# Patient Record
Sex: FEMALE | URBAN - METROPOLITAN AREA
[De-identification: names, ages, dates, MRNs, and addresses within clinical notes are randomized per-mention and may not be internally consistent; named-entity substitution may affect disease eponyms.]

---
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
## 2019-01-03 ENCOUNTER — TELEPHONE (OUTPATIENT)
Dept: SURGERY | Facility: CLINIC | Age: 19
End: 2019-01-03

## 2019-01-03 NOTE — TELEPHONE ENCOUNTER
Writer spoke with patient over the phone. Reminded them of their upcoming appointment which is scheduled on 01/08/19 at 0830 am. Patient had no questions or concerns this day. They were given our call back number in the event they need to reschedule or have a question.     Estelita Jesus LPN  [facility]

## 2019-01-08 ENCOUNTER — TELEPHONE (OUTPATIENT)
Dept: PLASTIC SURGERY | Facility: CLINIC | Age: 19
End: 2019-01-08

## 2019-01-08 ENCOUNTER — OFFICE VISIT (OUTPATIENT)
Dept: PLASTIC SURGERY | Facility: CLINIC | Age: 19
End: 2019-01-08
Attending: PLASTIC SURGERY
Payer: COMMERCIAL

## 2019-01-08 ENCOUNTER — MYC MEDICAL ADVICE (OUTPATIENT)
Dept: PLASTIC SURGERY | Facility: CLINIC | Age: 19
End: 2019-01-08

## 2019-01-08 VITALS
DIASTOLIC BLOOD PRESSURE: 90 MMHG | WEIGHT: 293 LBS | HEART RATE: 91 BPM | TEMPERATURE: 97.6 F | OXYGEN SATURATION: 100 % | HEIGHT: 70 IN | SYSTOLIC BLOOD PRESSURE: 132 MMHG | BODY MASS INDEX: 41.95 KG/M2 | RESPIRATION RATE: 14 BRPM

## 2019-01-08 DIAGNOSIS — N62 HYPERTROPHY OF BREAST: Primary | ICD-10-CM

## 2019-01-08 PROCEDURE — G0463 HOSPITAL OUTPT CLINIC VISIT: HCPCS | Mod: ZF

## 2019-01-08 RX ORDER — CEFAZOLIN SODIUM 1 G/50ML
1 INJECTION, SOLUTION INTRAVENOUS SEE ADMIN INSTRUCTIONS
Status: CANCELLED | OUTPATIENT
Start: 2019-01-08

## 2019-01-08 RX ORDER — CEFAZOLIN SODIUM IN 0.9 % NACL 3 G/100 ML
3 INTRAVENOUS SOLUTION, PIGGYBACK (ML) INTRAVENOUS
Status: CANCELLED | OUTPATIENT
Start: 2019-01-08

## 2019-01-08 ASSESSMENT — MIFFLIN-ST. JEOR: SCORE: 2187.35

## 2019-01-08 ASSESSMENT — PAIN SCALES - GENERAL: PAINLEVEL: NO PAIN (0)

## 2019-01-08 NOTE — LETTER
1/8/2019       RE: Claire Rooney  [address]  [facility] [address]     Dear Colleague,    Thank you for referring your patient, Claire Rooney, to the [facility] BREAST CENTER at [facility]. Please see a copy of my visit note below.    REFERRING PHYSICIAN:  Jazmín Hernandez MD      PRESENTING COMPLAINT:  Consultation for breast reduction.      HISTORY OF PRESENTING COMPLAINT:  Ms. Rooney is 18 years old.  She has been thinking about a breast reduction for about a year now.  She has a lot of upper back, neck, shoulder pain, shoulder grooving from bra straps, inframammary fold rashes during summers.  She wears an H-cup bra.  She would like to be around a C or D cup.  She never has had a mammogram.  She has no family history of breast cancer.      PAST MEDICAL HISTORY:  Nil.      PAST SURGICAL HISTORY:  Nil.      MEDICATIONS:  Birth control.      ALLERGIES:  Nil.      SOCIAL HISTORY:  Does not smoke, does not drink.  Lives in [place].  Is studying psychology.      REVIEW OF SYSTEMS:  Denies chest pain, shortness of breath, MI, CVA, DVT and PE.      PHYSICAL EXAMINATION:  Vital signs stable.  She is afebrile, in no obvious distress.  She is 5 feet 10 inches, 293 pounds, BMI of 42 kg/m2.  Body surface area 2.48 m2.  On examination of her breasts, she has long, pendulous breasts.  Right breast is larger by about 10%-15% than the left side.  Sternal notch to nipple distance is about 40 cm.  She has very glandular, large breasts.      ASSESSMENT AND PLAN:  Based on the above findings, a diagnosis of symptomatic bilateral breast hypertrophy was made.  I had a long discussion with the patient and her mother about what a breast reduction would entail.  Given her young age, clear expectations were given to the patient.  Namely, she may not be able to breastfeed.  She may lose nipple sensation.  She will have permanent scarring.  She may require re-reduction in the  future.  She will be asymmetric and she may require free nipple graft.  All of this was discussed in detail.  The need for prior authorization was explained.  Based on the Schnur scale, 1400 grams needs to be removed from each breast.  I think that is possible but will leave her disproportionately small and she was clearly explained that.  She was okay with that.  The need for a possible free nipple graft was explained, the disadvantage of inability to breastfeed, loss of nipple sensation, hypopigmentation and wound healing complications were explained.  She understood and wants to proceed.  Consent obtained.  Photographs obtained.  We will get prior authorization and proceed as indicated.  All risks, benefits, alternatives of potential procedure including pain, infection, bleeding, scarring, asymmetry, seromas, hematomas, wound breakdown, wound dehiscence, asymmetry, nipple necrosis, fat necrosis, T-junction site necrosis, DVT, PE, MI, CVA, pneumonia, renal failure and death were all explained.  She was happy with visit.  I look forward to helping her out in the near future.        Total time spent with patient 30 minutes, more than half was counseling.            Again, thank you for allowing me to participate in the care of your patient.      Sincerely,    DIONNE Fernando MD      cc:   Jazmín Hernandez MD   [facility]   [address] [place], [place] [id number]

## 2019-01-08 NOTE — PROGRESS NOTES
REFERRING PHYSICIAN:  Jazmín Hernandez MD      PRESENTING COMPLAINT:  Consultation for breast reduction.      HISTORY OF PRESENTING COMPLAINT:  Ms. Rooney is 18 years old.  She has been thinking about a breast reduction for about a year now.  She has a lot of upper back, neck, shoulder pain, shoulder grooving from bra straps, inframammary fold rashes during summers.  She wears an H-cup bra.  She would like to be around a C or D cup.  She never has had a mammogram.  She has no family history of breast cancer.      PAST MEDICAL HISTORY:  Nil.      PAST SURGICAL HISTORY:  Nil.      MEDICATIONS:  Birth control.      ALLERGIES:  Nil.      SOCIAL HISTORY:  Does not smoke, does not drink.  Lives in [place].  Is studying psychology.      REVIEW OF SYSTEMS:  Denies chest pain, shortness of breath, MI, CVA, DVT and PE.      PHYSICAL EXAMINATION:  Vital signs stable.  She is afebrile, in no obvious distress.  She is 5 feet 10 inches, 293 pounds, BMI of 42 kg/m2.  Body surface area 2.48 m2.  On examination of her breasts, she has long, pendulous breasts.  Right breast is larger by about 10%-15% than the left side.  Sternal notch to nipple distance is about 40 cm.  She has very glandular, large breasts.      ASSESSMENT AND PLAN:  Based on the above findings, a diagnosis of symptomatic bilateral breast hypertrophy was made.  I had a long discussion with the patient and her mother about what a breast reduction would entail.  Given her young age, clear expectations were given to the patient.  Namely, she may not be able to breastfeed.  She may lose nipple sensation.  She will have permanent scarring.  She may require re-reduction in the future.  She will be asymmetric and she may require free nipple graft.  All of this was discussed in detail.  The need for prior authorization was explained.  Based on the Schnur scale, 1400 grams needs to be removed from each breast.  I think that is possible but will leave her disproportionately  small and she was clearly explained that.  She was okay with that.  The need for a possible free nipple graft was explained, the disadvantage of inability to breastfeed, loss of nipple sensation, hypopigmentation and wound healing complications were explained.  She understood and wants to proceed.  Consent obtained.  Photographs obtained.  We will get prior authorization and proceed as indicated.  All risks, benefits, alternatives of potential procedure including pain, infection, bleeding, scarring, asymmetry, seromas, hematomas, wound breakdown, wound dehiscence, asymmetry, nipple necrosis, fat necrosis, T-junction site necrosis, DVT, PE, MI, CVA, pneumonia, renal failure and death were all explained.  She was happy with visit.  I look forward to helping her out in the near future.        Total time spent with patient 30 minutes, more than half was counseling.      cc:   Jazmín Hernandez MD   [facility]   [address] [place] [address]  [id number]

## 2019-01-08 NOTE — NURSING NOTE
"Oncology Rooming Note    January 8, 2019 8:29 AM   Claire Rooney is a 18 year old female who presents for:    Chief Complaint   Patient presents with     Oncology Clinic Visit     [facility] NEW- CONSULT FOR BREAST REDUCTION     Initial Vitals: /90 (BP Location: Right arm, Patient Position: Chair, Cuff Size: Adult Large)   Pulse 91   Temp 97.6  F (36.4  C) (Oral)   Resp 14   Ht 1.78 m (5' 10.08\")   Wt 133.1 kg (293 lb 6.4 oz)   SpO2 100%   BMI 42.00 kg/m   Estimated body mass index is 42 kg/m  as calculated from the following:    Height as of this encounter: 1.78 m (5' 10.08\").    Weight as of this encounter: 133.1 kg (293 lb 6.4 oz). Body surface area is 2.57 meters squared.  No Pain (0) Comment: Data Unavailable   No LMP recorded.  Allergies reviewed: Yes  Medications reviewed: Yes    Medications: Medication refills not needed today.  Pharmacy name entered into EPIC: Data Unavailable    Clinical concerns: New patientAubree Fernanod was notified.    10 minutes for nursing intake (face to face time)     Robert Hernandez LPN            "

## 2019-01-08 NOTE — TELEPHONE ENCOUNTER
Left message to schedule procedure with Dr Maurice Sloan    Details left with my direct contact number for scheduling.     Viola Nolan  Rebekah-Op Surgical Coordinator  [phone]

## 2019-01-14 ENCOUNTER — TELEPHONE (OUTPATIENT)
Dept: SURGERY | Facility: CLINIC | Age: 19
End: 2019-01-14

## 2019-01-14 ENCOUNTER — TELEPHONE (OUTPATIENT)
Dept: PLASTIC SURGERY | Facility: CLINIC | Age: 19
End: 2019-01-14

## 2019-01-14 NOTE — TELEPHONE ENCOUNTER
Spoke with patient to schedule surgery with Dr Sukhjinder Fernando    Sur[facility] was scheduled [facility]    Patient will have Pre-Op with [facility] on 3/5  -Patient advised H&P is needed or surgery cancellation may occur    Post-Op care appointment scheduled?  YES on 3/19    Patient is aware a / is needed day of surgery.     Surgery packet was sent via [employer], patient has my direct contact information for any further questions.     Viola Nolan  Surgical Rebekah-Op Coordinator  [phone]

## 2019-01-18 ENCOUNTER — TELEPHONE (OUTPATIENT)
Dept: SURGERY | Facility: CLINIC | Age: 19
End: 2019-01-18

## 2019-01-18 NOTE — TELEPHONE ENCOUNTER
Received [facility] prior authorization approval #[id number] for bilateral  breast mammoplasty, date span 1/14/19-7/14/19

## 2019-02-11 ENCOUNTER — TELEPHONE (OUTPATIENT)
Dept: SURGERY | Facility: CLINIC | Age: 19
End: 2019-02-11

## 2019-02-11 NOTE — TELEPHONE ENCOUNTER
Spoke with patient regarding surgery date. She had preferred to move it up if possible.  Advised 2/28 may work, will contact her 2/12 after speaking with OR .    She also needed to check with her family and school.

## 2019-02-12 ENCOUNTER — TELEPHONE (OUTPATIENT)
Dept: PLASTIC SURGERY | Facility: CLINIC | Age: 19
End: 2019-02-12

## 2019-02-12 NOTE — TELEPHONE ENCOUNTER
Please change the dates on the letter to reflect her new surgery date of 2/28. Ok to send via [employer].    Thanks!

## 2019-02-13 ASSESSMENT — ENCOUNTER SYMPTOMS
PANIC: 0
INSOMNIA: 1
DECREASED CONCENTRATION: 0
NERVOUS/ANXIOUS: 1
DEPRESSION: 1

## 2019-02-25 SDOH — HEALTH STABILITY: MENTAL HEALTH: HOW OFTEN DO YOU HAVE A DRINK CONTAINING ALCOHOL?: NEVER

## 2019-02-26 ENCOUNTER — OFFICE VISIT (OUTPATIENT)
Dept: PLASTIC SURGERY | Facility: CLINIC | Age: 19
End: 2019-02-26
Attending: PLASTIC SURGERY
Payer: COMMERCIAL

## 2019-02-26 VITALS
OXYGEN SATURATION: 100 % | BODY MASS INDEX: 41.95 KG/M2 | HEIGHT: 70 IN | RESPIRATION RATE: 16 BRPM | HEART RATE: 70 BPM | TEMPERATURE: 98.8 F | SYSTOLIC BLOOD PRESSURE: 115 MMHG | DIASTOLIC BLOOD PRESSURE: 75 MMHG | WEIGHT: 293 LBS

## 2019-02-26 DIAGNOSIS — N62 HYPERTROPHY OF BREAST: Primary | ICD-10-CM

## 2019-02-26 PROCEDURE — G0463 HOSPITAL OUTPT CLINIC VISIT: HCPCS | Mod: ZF

## 2019-02-26 ASSESSMENT — MIFFLIN-ST. JEOR: SCORE: 2187.37

## 2019-02-26 NOTE — LETTER
2/26/2019     RE: Claire Rooney  [address]  [facility] [facility] [address]     Dear Colleague,    Thank you for referring your patient, Claire Rooney, to the [facility] BREAST CENTER at [facility]. Please see a copy of my visit note below.    PRESENTING COMPLAINT:  Preoperative visit for upcoming bilateral breast reduction scheduled for 02/28/2019.      HISTORY OF PRESENTING COMPLAINT:  Ms. Rooney is 18 years old.  She is scheduled for bilateral breast reduction and is here for a preoperative visit.  The plan is to remove about 1400 g per side if that is possible.  She may need a free nipple graft, and she may be disproportionately small.  She understands and is okay with it.  No change in her history and physical exam.  No history of a DVT.      ASSESSMENT AND PLAN:  Based upon the above findings, a diagnosis of symptomatic bilateral breast hypertrophy was made.  The plan is to do a bilateral breast reduction in the coming days.  No change in her history and physical exam.  I went over the planned procedure with the patient and her mother in detail.  The expectations were clearly explained.  The chance of a free nipple graft was clearly explained.  All risks, benefits and alternatives, including pain, infection, bleeding, scarring, asymmetry, seromas, hematomas, wound breakdown, wound dehiscence, loss of sensation of the nipple and breast, hypopigmentation and wound-healing complications if a free nipple graft were done were explained, fat necrosis, skin necrosis, T-junction site necrosis, nipple necrosis, inability to breastfeed, re-hypertrophy in the future, prominent scar, hypertrophic scar, keloid scar, DVT, PE, MI, CVA, pneumonia, renal failure and death, were explained.  I look forward to helping her out.  She wants to proceed.  All questions were answered.  She was happy with the visit.        Total time spent with the patient was 15 minutes, of which  more than half was counseling.     DIONNE Fernando MD

## 2019-02-26 NOTE — PROGRESS NOTES
PRESENTING COMPLAINT:  Preoperative visit for upcoming bilateral breast reduction scheduled for 02/28/2019.      HISTORY OF PRESENTING COMPLAINT:  Ms. Rooney is 18 years old.  She is scheduled for bilateral breast reduction and is here for a preoperative visit.  The plan is to remove about 1400 g per side if that is possible.  She may need a free nipple graft, and she may be disproportionately small.  She understands and is okay with it.  No change in her history and physical exam.  No history of a DVT.      ASSESSMENT AND PLAN:  Based upon the above findings, a diagnosis of symptomatic bilateral breast hypertrophy was made.  The plan is to do a bilateral breast reduction in the coming days.  No change in her history and physical exam.  I went over the planned procedure with the patient and her mother in detail.  The expectations were clearly explained.  The chance of a free nipple graft was clearly explained.  All risks, benefits and alternatives, including pain, infection, bleeding, scarring, asymmetry, seromas, hematomas, wound breakdown, wound dehiscence, loss of sensation of the nipple and breast, hypopigmentation and wound-healing complications if a free nipple graft were done were explained, fat necrosis, skin necrosis, T-junction site necrosis, nipple necrosis, inability to breastfeed, re-hypertrophy in the future, prominent scar, hypertrophic scar, keloid scar, DVT, PE, MI, CVA, pneumonia, renal failure and death, were explained.  I look forward to helping her out.  She wants to proceed.  All questions were answered.  She was happy with the visit.        Total time spent with the patient was 15 minutes, of which more than half was counseling.

## 2019-02-26 NOTE — NURSING NOTE
"Oncology Rooming Note    February 26, 2019 9:00 AM   Claire Rooney is a 18 year old female who presents for:    Chief Complaint   Patient presents with     Oncology Clinic Visit     New Pre-op; Vitals completed by CMA      Initial Vitals: /75   Pulse 70   Temp 98.8  F (37.1  C) (Oral)   Resp 16   Ht 1.78 m (5' 10.08\")   Wt 133.1 kg (293 lb 6.4 oz)   SpO2 100%   Breastfeeding? No   BMI 42.00 kg/m   Estimated body mass index is 42 kg/m  as calculated from the following:    Height as of this encounter: 1.78 m (5' 10.08\").    Weight as of this encounter: 133.1 kg (293 lb 6.4 oz). Body surface area is 2.57 meters squared.  Data Unavailable Comment: Data Unavailable   No LMP recorded. Patient has had an implant.  Allergies reviewed: Yes  Medications reviewed: Yes    Medications: Medication refills not needed today.  Pharmacy name entered into EPIC: Data Unavailable    Clinical concerns: No new concerns today  Dr. Fernando was NOT notified.      Georgette Cervantes              "

## 2019-02-27 ENCOUNTER — ANESTHESIA EVENT (OUTPATIENT)
Dept: SURGERY | Facility: AMBULATORY SURGERY CENTER | Age: 19
End: 2019-02-27

## 2019-02-27 ASSESSMENT — LIFESTYLE VARIABLES: TOBACCO_USE: 0

## 2019-02-27 NOTE — ANESTHESIA PREPROCEDURE EVALUATION
Anesthesia Pre-Procedure Evaluation    Patient: Claire Rooney   MRN:     [id number] Gender:   female   Age:    18 year old :      2000        Preoperative Diagnosis: Breast Hypertrophy   Procedure(s):  Bilateral Breast Reduction     No past medical history on file.   No past surgical history on file.       Anesthesia Evaluation     . Pt has not had prior anesthetic            ROS/MED HX    ENT/Pulmonary:      (-) tobacco use   Neurologic:  - neg neurologic ROS     Cardiovascular:  - neg cardiovascular ROS       METS/Exercise Tolerance:     Hematologic:  - neg hematologic  ROS       Musculoskeletal:  - neg musculoskeletal ROS       GI/Hepatic:  - neg GI/hepatic ROS       Renal/Genitourinary:  - ROS Renal section negative       Endo:  - neg endo ROS       Psychiatric:  - neg psychiatric ROS       Infectious Disease:  - neg infectious disease ROS       Malignancy:      - no malignancy   Other: Comment: (Breast Hypertrophy)   - neg other ROS                     PHYSICAL EXAM:   Mental Status/Neuro: A/A/O   Airway: Facies: Feasible  Mallampati: Not Assessed  Mouth/Opening: Not Assessed  TM distance: Not Assessed  Neck ROM: Not Assessed   Respiratory: Auscultation: CTAB     Resp. Rate: Normal     Resp. Effort: Normal      CV: Rhythm: Regular  Rate: Age appropriate  Heart: Normal Sounds   Comments:      Dental: Normal                  No results found for: WBC, HGB, HCT, PLT, CRP, SED, NA, POTASSIUM, CHLORIDE, CO2, BUN, CR, GLC, SULAIMAN, PHOS, MAG, ALBUMIN, PROTTOTAL, ALT, AST, GGT, ALKPHOS, BILITOTAL, BILIDIRECT, LIPASE, AMYLASE, ROSINA, PTT, INR, FIBR, TSH, T4, T3, HCG, HCGS, CKTOTAL, CKMB, TROPN    Preop Vitals  BP Readings from Last 3 Encounters:   19 115/75   19 132/90    Pulse Readings from Last 3 Encounters:   19 70   19 91      Resp Readings from Last 3 Encounters:   19 16   19 14    SpO2 Readings from Last 3 Encounters:   19 100%   19 100%      Temp  "Readings from Last 1 Encounters:   02/26/19 37.1  C (98.8  F) (Oral)    Ht Readings from Last 1 Encounters:   02/26/19 1.78 m (5' 10.08\") (99 %)*     * Growth percentiles are based on CDC (Girls, 2-20 Years) data.      Wt Readings from Last 1 Encounters:   02/26/19 133.1 kg (293 lb 6.4 oz) (>99 %)*     * Growth percentiles are based on CDC (Girls, 2-20 Years) data.    Estimated body mass index is 42 kg/m  as calculated from the following:    Height as of 2/26/19: 1.78 m (5' 10.08\").    Weight as of 2/26/19: 133.1 kg (293 lb 6.4 oz).     LDA:            Assessment:   ASA SCORE: 1    NPO Status: > 2 hours since completed Clear Liquids; > 6 hours since completed Solid Foods   Documentation: H&P complete; Preop Testing complete; Consents complete   Proceeding: Proceed without further delay  Tobacco Use:  NO Active use of Tobacco/UNKNOWN Tobacco use status     Plan:   Anes. Type:  General; Regional     RA Details:  Pre Induction; SS; Exparel; FOR POSTOP PAIN CONTROL; L; R     RA-Location/Type: Plane Block   Pre-Induction: Midazolam IV; Opioid IV; Acetaminophen PO   Induction:  IV (Standard); Propofol   Airway: Oral ETT   Access/Monitoring: PIV   Maintenance: Balanced   Emergence: Procedure Site   Logistics: Same Day Surgery     Postop Pain/Sedation Strategy:  Standard-Options: Opioids PRN; Block SS; Exparel     PONV Management:  Adult Risk Factors: Female, Non-Smoker, Postop Opioids  Prevention: Ondansetron; Dexamethasone     CONSENT: Direct conversation   Plan and risks discussed with: Mother; Patient   Blood Products: N/a       Comments for Plan/Consent:  19 yo for Bilateral Breast Reduction (Bilateral Breast) under GETA/RA                         Adi David MD  "

## 2019-02-28 ENCOUNTER — ANESTHESIA (OUTPATIENT)
Dept: SURGERY | Facility: AMBULATORY SURGERY CENTER | Age: 19
End: 2019-02-28

## 2019-02-28 ENCOUNTER — HOSPITAL ENCOUNTER (OUTPATIENT)
Facility: AMBULATORY SURGERY CENTER | Age: 19
End: 2019-02-28
Attending: PLASTIC SURGERY
Payer: COMMERCIAL

## 2019-02-28 VITALS
HEART RATE: 80 BPM | RESPIRATION RATE: 20 BRPM | BODY MASS INDEX: 42.27 KG/M2 | WEIGHT: 285.4 LBS | TEMPERATURE: 98.4 F | SYSTOLIC BLOOD PRESSURE: 122 MMHG | OXYGEN SATURATION: 100 % | HEIGHT: 69 IN | DIASTOLIC BLOOD PRESSURE: 51 MMHG

## 2019-02-28 DIAGNOSIS — N62 BREAST HYPERTROPHY: Primary | ICD-10-CM

## 2019-02-28 LAB
HCG UR QL: NEGATIVE
INTERNAL QC OK POCT: YES

## 2019-02-28 RX ORDER — FENTANYL CITRATE 50 UG/ML
25-50 INJECTION, SOLUTION INTRAMUSCULAR; INTRAVENOUS
Status: DISCONTINUED | OUTPATIENT
Start: 2019-02-28 | End: 2019-02-28 | Stop reason: HOSPADM

## 2019-02-28 RX ORDER — PROPOFOL 10 MG/ML
INJECTION, EMULSION INTRAVENOUS PRN
Status: DISCONTINUED | OUTPATIENT
Start: 2019-02-28 | End: 2019-02-28

## 2019-02-28 RX ORDER — CEFAZOLIN SODIUM 1 G/50ML
1 SOLUTION INTRAVENOUS SEE ADMIN INSTRUCTIONS
Status: DISCONTINUED | OUTPATIENT
Start: 2019-02-28 | End: 2019-02-28 | Stop reason: HOSPADM

## 2019-02-28 RX ORDER — FENTANYL CITRATE 50 UG/ML
INJECTION, SOLUTION INTRAMUSCULAR; INTRAVENOUS PRN
Status: DISCONTINUED | OUTPATIENT
Start: 2019-02-28 | End: 2019-02-28

## 2019-02-28 RX ORDER — PROPOFOL 10 MG/ML
INJECTION, EMULSION INTRAVENOUS CONTINUOUS PRN
Status: DISCONTINUED | OUTPATIENT
Start: 2019-02-28 | End: 2019-02-28

## 2019-02-28 RX ORDER — AMOXICILLIN 250 MG
1-2 CAPSULE ORAL 2 TIMES DAILY
Qty: 30 TABLET | Refills: 0 | Status: SHIPPED | OUTPATIENT
Start: 2019-02-28 | End: 2019-03-12

## 2019-02-28 RX ORDER — ONDANSETRON 2 MG/ML
4 INJECTION INTRAMUSCULAR; INTRAVENOUS EVERY 30 MIN PRN
Status: DISCONTINUED | OUTPATIENT
Start: 2019-02-28 | End: 2019-03-01 | Stop reason: HOSPADM

## 2019-02-28 RX ORDER — NALOXONE HYDROCHLORIDE 0.4 MG/ML
.1-.4 INJECTION, SOLUTION INTRAMUSCULAR; INTRAVENOUS; SUBCUTANEOUS
Status: DISCONTINUED | OUTPATIENT
Start: 2019-02-28 | End: 2019-03-01 | Stop reason: HOSPADM

## 2019-02-28 RX ORDER — FLUMAZENIL 0.1 MG/ML
0.2 INJECTION, SOLUTION INTRAVENOUS
Status: DISCONTINUED | OUTPATIENT
Start: 2019-02-28 | End: 2019-02-28 | Stop reason: HOSPADM

## 2019-02-28 RX ORDER — GABAPENTIN 300 MG/1
300 CAPSULE ORAL ONCE
Status: COMPLETED | OUTPATIENT
Start: 2019-02-28 | End: 2019-02-28

## 2019-02-28 RX ORDER — BUPIVACAINE HYDROCHLORIDE 2.5 MG/ML
INJECTION, SOLUTION EPIDURAL; INFILTRATION; INTRACAUDAL PRN
Status: DISCONTINUED | OUTPATIENT
Start: 2019-02-28 | End: 2019-02-28

## 2019-02-28 RX ORDER — CEFAZOLIN SODIUM IN 0.9 % NACL 3 G/100 ML
3 INTRAVENOUS SOLUTION, PIGGYBACK (ML) INTRAVENOUS
Status: COMPLETED | OUTPATIENT
Start: 2019-02-28 | End: 2019-02-28

## 2019-02-28 RX ORDER — ONDANSETRON 4 MG/1
4-8 TABLET, ORALLY DISINTEGRATING ORAL EVERY 8 HOURS PRN
Qty: 4 TABLET | Refills: 0 | Status: SHIPPED | OUTPATIENT
Start: 2019-02-28 | End: 2019-03-12

## 2019-02-28 RX ORDER — OXYCODONE HYDROCHLORIDE 5 MG/1
5 TABLET ORAL EVERY 4 HOURS PRN
Status: DISCONTINUED | OUTPATIENT
Start: 2019-02-28 | End: 2019-03-01 | Stop reason: HOSPADM

## 2019-02-28 RX ORDER — OXYCODONE HYDROCHLORIDE 5 MG/1
5-10 TABLET ORAL EVERY 6 HOURS PRN
Qty: 25 TABLET | Refills: 0 | Status: SHIPPED | OUTPATIENT
Start: 2019-02-28 | End: 2019-03-12

## 2019-02-28 RX ORDER — ACETAMINOPHEN 325 MG/1
975 TABLET ORAL ONCE
Status: COMPLETED | OUTPATIENT
Start: 2019-02-28 | End: 2019-02-28

## 2019-02-28 RX ORDER — MEPERIDINE HYDROCHLORIDE 25 MG/ML
12.5 INJECTION INTRAMUSCULAR; INTRAVENOUS; SUBCUTANEOUS
Status: DISCONTINUED | OUTPATIENT
Start: 2019-02-28 | End: 2019-03-01 | Stop reason: HOSPADM

## 2019-02-28 RX ORDER — DEXAMETHASONE SODIUM PHOSPHATE 4 MG/ML
INJECTION, SOLUTION INTRA-ARTICULAR; INTRALESIONAL; INTRAMUSCULAR; INTRAVENOUS; SOFT TISSUE PRN
Status: DISCONTINUED | OUTPATIENT
Start: 2019-02-28 | End: 2019-02-28

## 2019-02-28 RX ORDER — LIDOCAINE HYDROCHLORIDE 20 MG/ML
INJECTION, SOLUTION INFILTRATION; PERINEURAL PRN
Status: DISCONTINUED | OUTPATIENT
Start: 2019-02-28 | End: 2019-02-28

## 2019-02-28 RX ORDER — ONDANSETRON 2 MG/ML
INJECTION INTRAMUSCULAR; INTRAVENOUS PRN
Status: DISCONTINUED | OUTPATIENT
Start: 2019-02-28 | End: 2019-02-28

## 2019-02-28 RX ORDER — FENTANYL CITRATE 50 UG/ML
25-50 INJECTION, SOLUTION INTRAMUSCULAR; INTRAVENOUS
Status: DISCONTINUED | OUTPATIENT
Start: 2019-02-28 | End: 2019-03-01 | Stop reason: HOSPADM

## 2019-02-28 RX ORDER — ONDANSETRON 4 MG/1
4 TABLET, ORALLY DISINTEGRATING ORAL EVERY 30 MIN PRN
Status: DISCONTINUED | OUTPATIENT
Start: 2019-02-28 | End: 2019-03-01 | Stop reason: HOSPADM

## 2019-02-28 RX ORDER — SODIUM CHLORIDE, SODIUM LACTATE, POTASSIUM CHLORIDE, CALCIUM CHLORIDE 600; 310; 30; 20 MG/100ML; MG/100ML; MG/100ML; MG/100ML
INJECTION, SOLUTION INTRAVENOUS CONTINUOUS
Status: DISCONTINUED | OUTPATIENT
Start: 2019-02-28 | End: 2019-02-28 | Stop reason: HOSPADM

## 2019-02-28 RX ORDER — SODIUM CHLORIDE, SODIUM LACTATE, POTASSIUM CHLORIDE, CALCIUM CHLORIDE 600; 310; 30; 20 MG/100ML; MG/100ML; MG/100ML; MG/100ML
INJECTION, SOLUTION INTRAVENOUS CONTINUOUS
Status: DISCONTINUED | OUTPATIENT
Start: 2019-02-28 | End: 2019-03-01 | Stop reason: HOSPADM

## 2019-02-28 RX ORDER — LIDOCAINE 40 MG/G
CREAM TOPICAL
Status: DISCONTINUED | OUTPATIENT
Start: 2019-02-28 | End: 2019-02-28 | Stop reason: HOSPADM

## 2019-02-28 RX ORDER — NALOXONE HYDROCHLORIDE 0.4 MG/ML
.1-.4 INJECTION, SOLUTION INTRAMUSCULAR; INTRAVENOUS; SUBCUTANEOUS
Status: DISCONTINUED | OUTPATIENT
Start: 2019-02-28 | End: 2019-02-28 | Stop reason: HOSPADM

## 2019-02-28 RX ADMIN — SODIUM CHLORIDE, SODIUM LACTATE, POTASSIUM CHLORIDE, CALCIUM CHLORIDE: 600; 310; 30; 20 INJECTION, SOLUTION INTRAVENOUS at 12:25

## 2019-02-28 RX ADMIN — FENTANYL CITRATE 100 MCG: 50 INJECTION, SOLUTION INTRAMUSCULAR; INTRAVENOUS at 14:07

## 2019-02-28 RX ADMIN — FENTANYL CITRATE 100 MCG: 50 INJECTION, SOLUTION INTRAMUSCULAR; INTRAVENOUS at 13:34

## 2019-02-28 RX ADMIN — DEXAMETHASONE SODIUM PHOSPHATE 4 MG: 4 INJECTION, SOLUTION INTRA-ARTICULAR; INTRALESIONAL; INTRAMUSCULAR; INTRAVENOUS; SOFT TISSUE at 13:45

## 2019-02-28 RX ADMIN — PROPOFOL 100 MG: 10 INJECTION, EMULSION INTRAVENOUS at 13:37

## 2019-02-28 RX ADMIN — PROPOFOL 200 MCG/KG/MIN: 10 INJECTION, EMULSION INTRAVENOUS at 13:38

## 2019-02-28 RX ADMIN — Medication 50 MG: at 13:52

## 2019-02-28 RX ADMIN — OXYCODONE HYDROCHLORIDE 5 MG: 5 TABLET ORAL at 16:41

## 2019-02-28 RX ADMIN — Medication 50 MG: at 13:37

## 2019-02-28 RX ADMIN — CEFAZOLIN SODIUM 1 G: 1 SOLUTION INTRAVENOUS at 15:39

## 2019-02-28 RX ADMIN — Medication 3 G: at 13:40

## 2019-02-28 RX ADMIN — SODIUM CHLORIDE, SODIUM LACTATE, POTASSIUM CHLORIDE, CALCIUM CHLORIDE: 600; 310; 30; 20 INJECTION, SOLUTION INTRAVENOUS at 14:39

## 2019-02-28 RX ADMIN — ACETAMINOPHEN 975 MG: 325 TABLET ORAL at 12:26

## 2019-02-28 RX ADMIN — Medication 0.5 MG: at 14:53

## 2019-02-28 RX ADMIN — GABAPENTIN 300 MG: 300 CAPSULE ORAL at 12:26

## 2019-02-28 RX ADMIN — ONDANSETRON 4 MG: 2 INJECTION INTRAMUSCULAR; INTRAVENOUS at 13:45

## 2019-02-28 RX ADMIN — PROPOFOL 200 MG: 10 INJECTION, EMULSION INTRAVENOUS at 13:35

## 2019-02-28 RX ADMIN — LIDOCAINE HYDROCHLORIDE 60 MG: 20 INJECTION, SOLUTION INFILTRATION; PERINEURAL at 13:34

## 2019-02-28 RX ADMIN — BUPIVACAINE HYDROCHLORIDE 20 ML: 2.5 INJECTION, SOLUTION EPIDURAL; INFILTRATION; INTRACAUDAL at 13:50

## 2019-02-28 ASSESSMENT — MIFFLIN-ST. JEOR
SCORE: 2129.98
SCORE: 2129.98

## 2019-02-28 NOTE — ANESTHESIA CARE TRANSFER NOTE
Patient: Claire Rooney    Procedure(s):  Bilateral Breast Reduction    Diagnosis: Breast Hypertrophy  Diagnosis Additional Information: No value filed.    Anesthesia Type:   General, Peripheral Nerve Block for post-op pain at surgeon's request, ETT     Note:  Airway :Room Air  Patient transferred to:PACU  Comments: Uneventful transport to PACU; VSS; Report given to RN; Pt comfortable; IV patent: pt exchanging wellHandoff Report: Identifed the Patient, Identified the Reponsible Provider, Reviewed the pertinent medical history, Discussed the surgical course, Reviewed Intra-OP anesthesia mangement and issues during anesthesia, Set expectations for post-procedure period and Allowed opportunity for questions and acknowledgement of understanding      Vitals: (Last set prior to Anesthesia Care Transfer)    CRNA VITALS  2/28/2019 1552 - 2/28/2019 1630      2/28/2019             Temp:  36.4  C (97.5  F)                Electronically Signed By: MARCELL Russell CRNA  February 28, 2019  4:30 PM

## 2019-02-28 NOTE — ANESTHESIA POSTPROCEDURE EVALUATION
Anesthesia POST Procedure Evaluation    Patient: Claire Rooney   MRN:     [id number] Gender:   female   Age:    18 year old :      2000        Preoperative Diagnosis: Breast Hypertrophy   Procedure(s):  Bilateral Breast Reduction   Postop Comments: No value filed.       Anesthesia Type:  General, Regional    Reportable Event: NO     PAIN: Uncomplicated   Sign Out status: Comfortable, Well controlled pain     PONV: No PONV   Sign Out status:  No Nausea or Vomiting     Neuro/Psych: Uneventful perioperative course   Sign Out Status: Preoperative baseline; Age appropriate mentation     Airway/Resp.: Uneventful perioperative course   Sign Out Status: Non labored breathing, age appropriate RR; Resp. Status within EXPECTED Parameters     CV: Uneventful perioperative course   Sign Out status: Appropriate BP and perfusion indices; Appropriate HR/Rhythm     Disposition:   Sign Out in:  PACU  Disposition:  Phase II; Home  Recovery Course: Uneventful  Follow-Up: Not required           Last Anesthesia Record Vitals:  CRNA VITALS  2019 1552 - 2019 1652      2019             Temp:  36.4  C (97.5  F)          Last PACU/Preop Vitals:  Vitals:    19 1651 19 1652 19 1653   BP:      Pulse:      Resp: 21 12 12   Temp:      SpO2: 100% 100%          Electronically Signed By: Jesus Toure MD, 2019, 5:31 PM

## 2019-02-28 NOTE — ANESTHESIA PROCEDURE NOTES
Peripheral Nerve Block Procedure Note  Date/Time: 2/28/2019 1:50 PM    Staff:     Anesthesiologist:  Jesus Toure MD    Resident/CRNA:  Adi David MD    Block performed by resident/CRNA in the presence of a teaching physician    Location: Pre-op  Procedure Start/Stop TImes:      2/28/2019 1:40 PM     2/28/2019 1:50 PM    patient identified, IV checked, site marked, risks and benefits discussed, informed consent, monitors and equipment checked, pre-op evaluation, at physician/surgeon's request and post-op pain management      Correct Patient: Yes      Correct Position: Yes      Correct Site: Yes      Correct Procedure: Yes      Correct Laterality:  Yes    Site Marked:  Yes  Procedure details:     Procedure:  Pectoralis    ASA:  1    Diagnosis:  Breast hypertrophy    Laterality:  Bilateral    Position:  Supine    Sterile Prep: chloraprep, mask and sterile gloves      Needle:  Short bevel    Needle gauge:  21    Needle length (inches):  4    Ultrasound: Yes      Ultrasound used to identify targeted nerve, plexus, or vascular structure and placed a needle adjacent to it      Permanent Image entered into patiient's record      Abnormal pain on injection: No      Blood Aspirated: No      Paresthesias:  No    Bleeding at site: No      Test dose negative for signs of intravascular injection: Yes      Bolus via:  Needle    Infusion Method:  Single Shot    Complications:  None  Assessment/Narrative:     Injection made incrementally with aspirations every (mL):  5

## 2019-02-28 NOTE — DISCHARGE INSTRUCTIONS
"        [facility]  Home Care Following Anesthesia  For 24 hours after surgery:  1. Get plenty of rest.  A responsible adult must stay with you for at least 24 hours after you leave the surgery center.  2. Do not drive or use heavy equipment.  If you have weakness or tingling, don't drive or use heavy equipment until this feeling goes away.   3. Do not drink alcohol.   4. Avoid strenuous or risky activities.  Ask for help when climbing stairs.  5. You may feel lightheaded.  IF so, sit for a few minutes before standing.  Have someone help you get up.   6. If you have nausea (feel sick to your stomach): Drink only clear liquids such as apple juice, ginger ale, broth or 7-Up.  Rest may also help.  Be sure to drink enough fluids.  Move to a regular diet as you feel able.   7. You may have a slight fever.  Call the doctor if your fever is over 100 F (37.7 C) (taken under the tongue) or lasts longer than 24 hours.  8. You may have a dry mouth, a sore throat, muscle aches or trouble sleeping. These should go away after 24 hours.  9. Do not make important or legal decisions.     Today you received a Marcaine or bupivacaine block to numb the nerves near your surgery site.  This is a block using local anesthetic or \"numbing\" medication injected around the nerves to anesthetize or \"numb\" the area supplied by those nerves.  This block is injected into the muscle layer near your surgical site.  The medication may numb the location where you had surgery for 6-18 hours, but may last up to 24 hours.      Today you received an Exparel block to numb the nerves near your surgery site.  This is a block using local anesthetic or \"numbing\" medication injected around the nerves to anesthetize or \"numb\" the area supplied by those nerves.  This block is injected into the muscle layer near your surgical site.  This medication may numb the location where you had surgery up to 72 hours.      Tips for " taking pain medications  To get the best pain relief possible, remember these points:    Take pain medications as directed, before pain becomes severe.    Pain medication can upset your stomach: taking it with food may help.    Constipation is a common side effect of pain medication. Drink plenty of  fluids.    Eat foods high in fiber. Take a stool softener if recommended by your doctor or pharmacist.    Do not drink alcohol, drive or operate machinery while taking pain medications.    Ask about other ways to control pain, such as with heat, ice or relaxation.    Tylenol/Acetaminophen Consumption  To help encourage the safe use of acetaminophen, the makers of TYLENOL  have lowered the maximum daily dose for single-ingredient Extra Strength TYLENOL  (acetaminophen) products sold in the [place] from 8 pills per day (4,000 mg) to 6 pills per day (3,000 mg). The dosing interval has also changed from 2 pills every 4-6 hours to 2 pills every 6 hours.    If you feel your pain relief is insufficient, you may take Tylenol/Acetaminophen in addition to your narcotic pain medication.     Be careful not to exceed 3,000 mg of Tylenol/Acetaminophen in a 24 hour period from all sources.    If you are taking extra strength Tylenol/acetaminophen (500 mg), the maximum dose is 6 tablets in 24 hours.    If you are taking regular strength acetaminophen (325 mg), the maximum dose is 9 tablets in 24 hours.    Call a doctor for any of the followin. Signs of infection (fever, growing tenderness at the surgery site, a large amount of drainage or bleeding, severe pain, foul-smelling drainage, redness, swelling).  2. It has been over 8 to 10 hours since surgery and you are still not able to urinate (pass water).  3. Headache for over 24 hours.  4. Numbness, tingling or weakness the day after surgery (if you had spinal anesthesia).  Your doctor is:  Dr. Sukhjinder Fernando, Plastic Surgery: [phone]                 Or dial [phone] and ask  for the resident on call for:  Plastics  For emergency care, call the:  [facility]:  [phone] (TTY for hearing impaired: [phone])      Post Operative Instructions: Regional Anesthetic for Chest and Abdominal Surgery    General Information:   Regional anesthesia is when local anesthetic or  numbing  medication is injected around the nerves to anesthetize or  numb  the area supplied by that set of nerves.     Types of Regional Blocks:  Transversus Abdominis Plane (TAP): A block injected beneath the covering of a muscle layer of the abdomen for abdominal surgery  Pectoral: A block injected near the breast for surgery on the breast and armpit  Paravertebral: A block injected in the back for surgery on the chest, ribs, and breast    Procedure:  The type of anesthesia your doctor used to numb your chest or abdomen will usually not wear off for 6-18 hours, but may last as long as 24 hours.     Diet:  There are no restrictions on your diet. You should drink plenty of fluids.     Discomfort:  You will have a tingling and prickly sensation in your chest or abdomen as the feeling begins to return. You can also expect some discomfort. The amount of discomfort is unpredictable, but if you have more pain than can be controlled with pain medication you should notify your physician.     Pain Medicine:   Begin taking your oral pain pills (if you have not already done so) before bedtime and during the night to avoid a sudden onset of pain as the block wears off.  Do not engage in drinking, driving, or hazardous occupations while taking pain medication.     Stitches:   You may have stitches or special skin closures. You doctor will inform you when to return to the office to have them removed.       BREAST REDUCTION POST-OPERATIVE INSTRUCTIONS    Instructions       Have someone drive you home after surgery and help you at home for 1-2      days.      Get plenty of rest.      Follow balanced diet.       Decreased activity may promote constipation, so you may want to add      more raw fruit to your diet, and be sure to increase fluid intake. Your doctor       may also order a stool softener.      Take pain medication as prescribed. Do not take aspirin or any products      containing aspirin.      Do not drink alcohol when taking pain medications.      Even when not taking pain medications, no alcohol for 3 weeks as it      causes fluid retention.      If you are taking vitamins with iron, resume these as tolerated.      Do not smoke, as smoking delays healing and increases the risk of      complications.    Activities      Do not drive fpr 10 days following your procedure and until you are no longer taking        any pain medications (narcotics).      Do not drive until you have full range of motion with your arms and can stop the car       or swerve in an emergency.      Start walking the evening of surgery, this helps to reduce swelling and       lowers the chance of blood clots.      Refrain from vigorous activities for 2-6 weeks.  Increase activity gradually as tolerated.      After 2 weeks, you may perform lower body exercise, but must wait the full 6 weeks       prior to performing upper body exercise      Avoid lifting anything over 5 pounds for 2 weeks.      Resume social and employment activities in about 2 weeks (if not too strenuous).    Incision Care      You may shower in 48 hours.  If drainage tubes have been used, you may shower       48 hours after removal of the drains. The ACE wrap (if used) may be rewrapped as needed (if too tight or loose). Use it for support and may subtitute with a sports bra if preferred.       Avoid exposing scars to sun for at least 12 months.      Always use a strong sunblock, if sun exposure is unavoidable (SPF 50 or      greater).      Keep the tape on until it starts to curl up on the ends, and then gently remove them.        You may use moisturizing cream at 10 days to  "help the tape come off. By two weeks,      you may pull off the tape if still in place.      Wear your surgical bra/wrap 24/7 as directed for 4 weeks.      Avoid bras with stays and underwires for 4-6 weeks.      You may pad the incisions with gauze for comfort (panty liners also work well as they        are absorbent and inexpensive).      Inspect daily for signs of infection.      No tub soaking, bathing, or swimming until wounds are healed.      If your breast skin is dry after surgery, you can apply a moisturizer several times a       day.     What to Expect      Despite the three layers of sutures closing your incisions, there will be some oozing       of tissue fluid from them for 2 days or so.  This will soak up on the gauze and the bra       to look like more than it really is.  Report any significant drainage to the clinic.      Most of the higher discomfort will subside after the first few days.      You may experience temporary soreness, bruising, swelling and tightnessin the       breasts as well as discomfort in the incision area.      You may have have normal sensation in the nipples.  This may be more or less than       usual, and usually returns over a couple of months.      Your first menstruation following surgery may cause your breasts to swell and hurt.      You may have random shooting pains, tingling, or other strange sensations in the            skin for a few months.  These will subside.    Appearance      Most of the discoloration and swelling will subside in 2-4 weeks.      Your breasts will feel firm to the touch initially, but will soften with time.      A more natural shape will occur as the breasts \"settle\" in a slightly lower position over     the first few months.      Scars may be red and thick for 6-12 months (longer in lighter-skinned patients).  IN          time, these usually soften and fade.    Follow-Up Care      Typically, you will have a post op check at 1-2 weeks, and again " with your surgeon in      another month.      If drainage tubes have been used, will be removed when the drainage is less than 30      ml per day for 1-2 days.  This usually happens in 1-3 weeks.    When to Call      If you have increased swelling or bruising, particular one side greater than the other.      If swelling and redness persist after a few days.      If you have increased redness along the incision.      If you have severe or increased pain not relieved by medication.      If you have any side effects to medications; such as, rash, nausea,      headache, vomiting, or constipation.      If you have an oral temperature over 100.4 degrees.      If you have any yellowish or greenish drainage from the incisions or      notice a foul odor.      If you have bleeding from the incisions that is difficult to control with      light pressure.      If you have loss of feeling or motion.      Any unanswered concern.    For Medical Questions, Please Call:      [phone], Monday - Friday, 8 a.m. - 4:30 p.m.      After hours and on weekends, call Hospital Paging at [phone] and      ask for the Plastic Surgeon on call.

## 2019-02-28 NOTE — OP NOTE
PREOPERATIVE DIAGNOSIS: Symptomatic bilateral breast hypertrophy.     POSTOPERATIVE DIAGNOSIS: Symptomatic bilateral breast hypertrophy.     PROCEDURES: Bilateral superomedial pedicle inverted T skin closure breast reduction.     SURGEON: Sukhjinder Fernando MD.     FELLOW: Fish Henderson MD    ANESTHESIA: General anesthesia with endotracheal intubation.     COMPLICATIONS: Nil.     DRAINS: Nil.     Blood Loss: 500 mL    SPECIMENS: Skin and breast tissue from right breast measuring about 2300 g, left breast measuring about 1800 g.     DESCRIPTION OF PROCEDURE: After informed consent was taken, the proper site and procedure was ascertained with the patient and was appropriately marked and taken to in the operating room.  She was placed in supine position with the knees comfortably flexed with pillows underneath them, and pneumoboots placed and running prior to induction of anesthesia. Preoperative antibiotics given in the OR. All pressure points were appropriately padded. General anesthesia was administered without any complications. She was placed in such a position that she could be flexed to about 50 degrees. Her arms were padded and abducted to about 50 degrees. She was prepped and draped in a standard surgical fashion. I began by first remarking the preop markings and marking a 42 mm areola on each side. I then marked out a superior medial pedicle on each side. I then de-epithelialized the pedicle on each side. I then dissected out the pedicle on each side without actually seeing the deep fascia and also released the pedicle such that it could rotate into its new nipple position without any tension. I then went ahead and on each side excised the inferomedial, inferior, inferolateral and lateral aspects of the breast according to a vertical pattern reduction. Strict hemostasis was ensured during this entire part of the case. Once this was done, I then temporarily closed the patient's breast in an inverted T fashion,  sat the patient up ensured symmetry and then marked out the new areolar opening symmetrically on each side. I then went ahead and closed the horizontal incision using 2-0 Monocryl suture in a deep dermal layer. Then I made sure that the nipple areolar complex could be retrieved without any tension, which is could on each side. I then checked that they were both viable, which they were. I then went ahead and excised the skin of the new areolar opening and de-epithelialized epithelialized the portion that was involved in the pedicle on each side. I then sutured in the nipple areolar complex using 2-0 Monocryl suture in a deep dermal fashion circumferentially. I then closed the vertical incision using 2-0 Monocryl suture to approximate the medial and lateral pillars, and then the deep dermis. I then ran all the incisions with 3-0 Strattafix suture in a running intracuticular manner followed by placement of Prineo, and then followed by an ACE wrap. At the end of the case, the patient's breasts were soft, nipples were viable, and breasts were symmetric. The patient tolerated the procedure well. All counts correct at the end of the case. The patient was extubated and sent to recovery room in a stable condition.

## 2019-03-04 LAB — COPATH REPORT: NORMAL

## 2019-03-12 ENCOUNTER — OFFICE VISIT (OUTPATIENT)
Dept: PLASTIC SURGERY | Facility: CLINIC | Age: 19
End: 2019-03-12
Attending: PLASTIC SURGERY
Payer: COMMERCIAL

## 2019-03-12 DIAGNOSIS — Z98.890 S/P BILATERAL BREAST REDUCTION: Primary | ICD-10-CM

## 2019-03-12 NOTE — LETTER
3/12/2019       RE: Claire Rooney  [address]  [facility] [address]     Dear Colleague,    Thank you for referring your patient, Claire Rooney, to the [facility] at [facility]. Please see a copy of my visit note below.    Pt. comes into clinic today at the request of Dr. Sukhjinder Fernando.    This service provided today was under the supervising provider of the day Dr. Fernando, who examined the pt following the nurse visit.    Reason for visit: Post op visit.  Pt returns 2 week(s) after bilateral breast reduction.    Incision/Drainage:  Well approximated, some drainage at T incision sites.  Loss of pigment on right nipple.  Pain:  denies  Instructions:  Frequent post op breast exams to get to know post op breast tissue.  Moisturize liberally twice a day.  Peel off surgical tape by 3 weeks post op.  Ok to switch to sports bra.  No underwires for 6 weeks.  Return to clinic:      Beth Blake, RN, BSN  Care Coordinator

## 2019-03-12 NOTE — PROGRESS NOTES
Pt. comes into clinic today at the request of Dr. Sukhjinder Fernando.    This service provided today was under the supervising provider of the day Dr. Fernando, who examined the pt following the nurse visit.    Reason for visit: Post op visit.  Pt returns 2 week(s) after bilateral breast reduction.    Incision/Drainage:  Well approximated, some drainage at T incision sites.  Loss of pigment on right nipple.  Pain:  denies  Instructions:  Frequent post op breast exams to get to know post op breast tissue.  Moisturize liberally twice a day.  Peel off surgical tape by 3 weeks post op.  Ok to switch to sports bra.  No underwires for 6 weeks.  Return to clinic:      Beth Blake RN, BSN  Care Coordinator

## 2020-03-11 ENCOUNTER — HEALTH MAINTENANCE LETTER (OUTPATIENT)
Age: 20
End: 2020-03-11

## 2020-12-27 ENCOUNTER — HEALTH MAINTENANCE LETTER (OUTPATIENT)
Age: 20
End: 2020-12-27

## 2021-04-24 ENCOUNTER — HEALTH MAINTENANCE LETTER (OUTPATIENT)
Age: 21
End: 2021-04-24

## 2021-10-09 ENCOUNTER — HEALTH MAINTENANCE LETTER (OUTPATIENT)
Age: 21
End: 2021-10-09

## 2022-05-21 ENCOUNTER — HEALTH MAINTENANCE LETTER (OUTPATIENT)
Age: 22
End: 2022-05-21

## 2022-09-17 ENCOUNTER — HEALTH MAINTENANCE LETTER (OUTPATIENT)
Age: 22
End: 2022-09-17

## 2023-06-04 ENCOUNTER — HEALTH MAINTENANCE LETTER (OUTPATIENT)
Age: 23
End: 2023-06-04

## (undated) DEVICE — SU STRATAFIX MONOCRYL 3-0 SPIRAL PS-2 45CM SXMP1B107

## (undated) DEVICE — LINEN TOWEL PACK X5 5464

## (undated) DEVICE — PREP CHLORAPREP 26ML TINTED ORANGE  260815

## (undated) DEVICE — ESU ELEC BLADE HEX-LOCKING 2.5" E1450X

## (undated) DEVICE — SUCTION MANIFOLD NEPTUNE 2 SYS 1 PORT 702-025-000

## (undated) DEVICE — PAD CHUX UNDERPAD 30X30"

## (undated) DEVICE — SUCTION TIP YANKAUER W/O VENT K86

## (undated) DEVICE — BNDG ELASTIC 6" DBL LENGTH UNSTERILE 6611-16

## (undated) DEVICE — SOL NACL 0.9% IRRIG 1000ML BOTTLE 2F7124

## (undated) DEVICE — PACK MINOR CUSTOM ASC

## (undated) DEVICE — ESU GROUND PAD ADULT W/CORD E7507

## (undated) DEVICE — PAD ARMBOARD FOAM EGGCRATE 50676-378

## (undated) DEVICE — DRAPE IOBAN INCISE 23X17" 6650EZ

## (undated) DEVICE — BLADE KNIFE SURG 10 371110

## (undated) DEVICE — SU MONOCRYL 2-0 SH 27" UND Y417H

## (undated) DEVICE — ESU PENCIL SMOKE EVAC W/ROCKER SWITCH 0703-047-000

## (undated) DEVICE — SPONGE LAP 18X18" X8435

## (undated) DEVICE — PEN MARKING SKIN TYCO DEVON DUAL TIP 31145868

## (undated) DEVICE — SU DERMABOND PRINEO CLR602US

## (undated) DEVICE — DRSG ABDOMINAL 07 1/2X8" 7197D

## (undated) DEVICE — STPL SKIN 35W 059037

## (undated) DEVICE — DRSG KERLIX 4 1/2"X4YDS ROLL 6730

## (undated) DEVICE — GLOVE PROTEXIS W/NEU-THERA 7.0  2D73TE70

## (undated) DEVICE — DRAPE U SPLIT 74X120" 29440

## (undated) RX ORDER — ACETAMINOPHEN 325 MG/1
TABLET ORAL
Status: DISPENSED
Start: 2019-02-28

## (undated) RX ORDER — LIDOCAINE HYDROCHLORIDE 20 MG/ML
INJECTION, SOLUTION EPIDURAL; INFILTRATION; INTRACAUDAL; PERINEURAL
Status: DISPENSED
Start: 2019-02-28

## (undated) RX ORDER — FENTANYL CITRATE 50 UG/ML
INJECTION, SOLUTION INTRAMUSCULAR; INTRAVENOUS
Status: DISPENSED
Start: 2019-02-28

## (undated) RX ORDER — PROPOFOL 10 MG/ML
INJECTION, EMULSION INTRAVENOUS
Status: DISPENSED
Start: 2019-02-28

## (undated) RX ORDER — GABAPENTIN 300 MG/1
CAPSULE ORAL
Status: DISPENSED
Start: 2019-02-28

## (undated) RX ORDER — CEFAZOLIN SODIUM 1 G/3ML
INJECTION, POWDER, FOR SOLUTION INTRAMUSCULAR; INTRAVENOUS
Status: DISPENSED
Start: 2019-02-28

## (undated) RX ORDER — DEXAMETHASONE SODIUM PHOSPHATE 4 MG/ML
INJECTION, SOLUTION INTRA-ARTICULAR; INTRALESIONAL; INTRAMUSCULAR; INTRAVENOUS; SOFT TISSUE
Status: DISPENSED
Start: 2019-02-28

## (undated) RX ORDER — OXYCODONE HYDROCHLORIDE 5 MG/1
TABLET ORAL
Status: DISPENSED
Start: 2019-02-28

## (undated) RX ORDER — HYDROMORPHONE HYDROCHLORIDE 1 MG/ML
INJECTION, SOLUTION INTRAMUSCULAR; INTRAVENOUS; SUBCUTANEOUS
Status: DISPENSED
Start: 2019-02-28

## (undated) RX ORDER — CEFAZOLIN SODIUM 1 G/50ML
SOLUTION INTRAVENOUS
Status: DISPENSED
Start: 2019-02-28

## (undated) RX ORDER — CEFAZOLIN SODIUM 2 G/50ML
SOLUTION INTRAVENOUS
Status: DISPENSED
Start: 2019-02-28

## (undated) RX ORDER — ONDANSETRON 2 MG/ML
INJECTION INTRAMUSCULAR; INTRAVENOUS
Status: DISPENSED
Start: 2019-02-28